# Patient Record
Sex: MALE | Race: WHITE | Employment: UNEMPLOYED | URBAN - METROPOLITAN AREA
[De-identification: names, ages, dates, MRNs, and addresses within clinical notes are randomized per-mention and may not be internally consistent; named-entity substitution may affect disease eponyms.]

---

## 2022-07-05 ENCOUNTER — HOSPITAL ENCOUNTER (OUTPATIENT)
Age: 52
Discharge: HOME | End: 2022-07-05
Attending: EMERGENCY MEDICINE
Payer: COMMERCIAL

## 2022-07-05 VITALS
SYSTOLIC BLOOD PRESSURE: 171 MMHG | BODY MASS INDEX: 36.53 KG/M2 | DIASTOLIC BLOOD PRESSURE: 99 MMHG | WEIGHT: 300 LBS | HEART RATE: 71 BPM | TEMPERATURE: 97 F | RESPIRATION RATE: 20 BRPM | OXYGEN SATURATION: 96 % | HEIGHT: 76 IN

## 2022-07-05 DIAGNOSIS — T78.3XXA ANGIOEDEMA DUE TO ANGIOTENSIN CONVERTING ENZYME INHIBITOR (ACE-I): Primary | ICD-10-CM

## 2022-07-05 DIAGNOSIS — T46.4X5A ANGIOEDEMA DUE TO ANGIOTENSIN CONVERTING ENZYME INHIBITOR (ACE-I): Primary | ICD-10-CM

## 2022-07-05 PROCEDURE — 99203 OFFICE O/P NEW LOW 30 MIN: CPT

## 2022-07-05 PROCEDURE — 99204 OFFICE O/P NEW MOD 45 MIN: CPT

## 2022-07-05 RX ORDER — BUPROPION HYDROCHLORIDE 300 MG/1
300 TABLET ORAL EVERY MORNING
COMMUNITY
Start: 2022-04-18

## 2022-07-05 RX ORDER — VILAZODONE HYDROCHLORIDE 40 MG/1
40 TABLET ORAL
COMMUNITY
Start: 2022-06-06

## 2022-07-05 RX ORDER — LISINOPRIL 20 MG/1
20 TABLET ORAL DAILY
COMMUNITY
Start: 2022-04-09

## 2022-07-05 RX ORDER — PREDNISONE 20 MG/1
60 TABLET ORAL ONCE
Status: COMPLETED | OUTPATIENT
Start: 2022-07-05 | End: 2022-07-05

## 2024-10-28 RX ORDER — LOSARTAN POTASSIUM 100 MG/1
1 TABLET ORAL DAILY
COMMUNITY

## 2024-10-28 RX ORDER — ATORVASTATIN CALCIUM 40 MG/1
TABLET, FILM COATED ORAL NIGHTLY
COMMUNITY
Start: 2024-01-15

## 2024-10-28 RX ORDER — METFORMIN HYDROCHLORIDE 500 MG/1
TABLET, EXTENDED RELEASE ORAL
COMMUNITY

## 2024-10-28 RX ORDER — DEXTROAMPHETAMINE SACCHARATE, AMPHETAMINE ASPARTATE MONOHYDRATE, DEXTROAMPHETAMINE SULFATE AND AMPHETAMINE SULFATE 7.5; 7.5; 7.5; 7.5 MG/1; MG/1; MG/1; MG/1
CAPSULE, EXTENDED RELEASE ORAL EVERY MORNING
COMMUNITY

## 2024-10-28 RX ORDER — MUPIROCIN 20 MG/G
1 OINTMENT TOPICAL 2 TIMES DAILY PRN
COMMUNITY
Start: 2024-04-08 | End: 2025-09-30

## 2024-10-28 RX ORDER — LEVOTHYROXINE SODIUM 100 UG/1
TABLET ORAL
COMMUNITY

## 2024-10-28 RX ORDER — CHLORAL HYDRATE 500 MG
2 CAPSULE ORAL DAILY
COMMUNITY
End: 2024-11-01

## 2024-10-28 RX ORDER — HYDROCHLOROTHIAZIDE 25 MG/1
12.5 TABLET ORAL
COMMUNITY
Start: 2024-02-19

## 2024-10-31 ENCOUNTER — APPOINTMENT (OUTPATIENT)
Dept: GENERAL RADIOLOGY | Facility: HOSPITAL | Age: 54
End: 2024-10-31
Attending: STUDENT IN AN ORGANIZED HEALTH CARE EDUCATION/TRAINING PROGRAM
Payer: COMMERCIAL

## 2024-10-31 ENCOUNTER — HOSPITAL ENCOUNTER (OUTPATIENT)
Facility: HOSPITAL | Age: 54
Discharge: HOME | End: 2024-11-01
Attending: ORTHOPAEDIC SURGERY | Admitting: ORTHOPAEDIC SURGERY
Payer: COMMERCIAL

## 2024-10-31 ENCOUNTER — ANESTHESIA (OUTPATIENT)
Dept: SURGERY | Facility: HOSPITAL | Age: 54
End: 2024-10-31
Payer: COMMERCIAL

## 2024-10-31 ENCOUNTER — ANESTHESIA EVENT (OUTPATIENT)
Dept: SURGERY | Facility: HOSPITAL | Age: 54
End: 2024-10-31
Payer: COMMERCIAL

## 2024-10-31 PROBLEM — G47.33 OSA (OBSTRUCTIVE SLEEP APNEA): Status: ACTIVE | Noted: 2024-10-31

## 2024-10-31 PROBLEM — M19.011 PRIMARY OSTEOARTHRITIS OF RIGHT SHOULDER: Status: ACTIVE | Noted: 2024-10-31

## 2024-10-31 PROBLEM — E66.01 MORBID OBESITY WITH BMI OF 40.0-44.9, ADULT (HCC): Status: ACTIVE | Noted: 2024-10-31

## 2024-10-31 PROBLEM — E03.9 HYPOTHYROIDISM: Status: ACTIVE | Noted: 2024-10-31

## 2024-10-31 PROBLEM — E78.5 HYPERLIPIDEMIA: Status: ACTIVE | Noted: 2024-10-31

## 2024-10-31 PROBLEM — I10 ESSENTIAL HYPERTENSION: Status: ACTIVE | Noted: 2024-10-31

## 2024-10-31 LAB
GLUCOSE BLDC GLUCOMTR-MCNC: 153 MG/DL (ref 70–99)
GLUCOSE BLDC GLUCOMTR-MCNC: 175 MG/DL (ref 70–99)
GLUCOSE BLDC GLUCOMTR-MCNC: 189 MG/DL (ref 70–99)
GLUCOSE BLDC GLUCOMTR-MCNC: 211 MG/DL (ref 70–99)
GLUCOSE BLDC GLUCOMTR-MCNC: 215 MG/DL (ref 70–99)

## 2024-10-31 PROCEDURE — 0RRJ0JZ REPLACEMENT OF RIGHT SHOULDER JOINT WITH SYNTHETIC SUBSTITUTE, OPEN APPROACH: ICD-10-PCS | Performed by: ORTHOPAEDIC SURGERY

## 2024-10-31 PROCEDURE — 73030 X-RAY EXAM OF SHOULDER: CPT | Performed by: STUDENT IN AN ORGANIZED HEALTH CARE EDUCATION/TRAINING PROGRAM

## 2024-10-31 PROCEDURE — 99204 OFFICE O/P NEW MOD 45 MIN: CPT | Performed by: HOSPITALIST

## 2024-10-31 DEVICE — TOBRA FULL DOSE ANTIBIOTIC BONE CEMENT, 10 PACK CATALOG NUMBER IS 6197-9-010
Type: IMPLANTABLE DEVICE | Site: SHOULDER | Status: FUNCTIONAL
Brand: SIMPLEX

## 2024-10-31 DEVICE — ALTIVATE ANATOMIC, NEUTRAL HUMERAL HEAD, 50X20
Type: IMPLANTABLE DEVICE | Site: SHOULDER | Status: FUNCTIONAL
Brand: DJO SURGICAL

## 2024-10-31 DEVICE — IMPLANTABLE DEVICE: Type: IMPLANTABLE DEVICE | Status: FUNCTIONAL

## 2024-10-31 DEVICE — ALTIVATE ANATOMIC CS, HUMERAL STEM AND NECK KIT, SIZE 3
Type: IMPLANTABLE DEVICE | Site: SHOULDER | Status: FUNCTIONAL
Brand: DJO SURGICAL

## 2024-10-31 RX ORDER — ONDANSETRON 2 MG/ML
4 INJECTION INTRAMUSCULAR; INTRAVENOUS EVERY 6 HOURS PRN
Status: DISCONTINUED | OUTPATIENT
Start: 2024-10-31 | End: 2024-11-01

## 2024-10-31 RX ORDER — MORPHINE SULFATE 4 MG/ML
4 INJECTION, SOLUTION INTRAMUSCULAR; INTRAVENOUS EVERY 10 MIN PRN
Status: DISCONTINUED | OUTPATIENT
Start: 2024-10-31 | End: 2024-10-31 | Stop reason: HOSPADM

## 2024-10-31 RX ORDER — VILAZODONE HYDROCHLORIDE 10 MG/1
10 TABLET ORAL EVERY MORNING
Status: DISCONTINUED | OUTPATIENT
Start: 2024-11-01 | End: 2024-11-01

## 2024-10-31 RX ORDER — FAMOTIDINE 20 MG/1
20 TABLET, FILM COATED ORAL ONCE
Status: COMPLETED | OUTPATIENT
Start: 2024-10-31 | End: 2024-10-31

## 2024-10-31 RX ORDER — NICOTINE POLACRILEX 4 MG
15 LOZENGE BUCCAL
Status: DISCONTINUED | OUTPATIENT
Start: 2024-10-31 | End: 2024-10-31 | Stop reason: HOSPADM

## 2024-10-31 RX ORDER — HYDROMORPHONE HYDROCHLORIDE 1 MG/ML
0.4 INJECTION, SOLUTION INTRAMUSCULAR; INTRAVENOUS; SUBCUTANEOUS EVERY 5 MIN PRN
Status: DISCONTINUED | OUTPATIENT
Start: 2024-10-31 | End: 2024-10-31 | Stop reason: HOSPADM

## 2024-10-31 RX ORDER — FAMOTIDINE 10 MG/ML
20 INJECTION, SOLUTION INTRAVENOUS ONCE
Status: COMPLETED | OUTPATIENT
Start: 2024-10-31 | End: 2024-10-31

## 2024-10-31 RX ORDER — SODIUM PHOSPHATE, DIBASIC AND SODIUM PHOSPHATE, MONOBASIC 7; 19 G/230ML; G/230ML
1 ENEMA RECTAL ONCE AS NEEDED
Status: DISCONTINUED | OUTPATIENT
Start: 2024-10-31 | End: 2024-11-01

## 2024-10-31 RX ORDER — POLYETHYLENE GLYCOL 3350 17 G/17G
17 POWDER, FOR SOLUTION ORAL DAILY PRN
Status: DISCONTINUED | OUTPATIENT
Start: 2024-10-31 | End: 2024-11-01

## 2024-10-31 RX ORDER — HYDROMORPHONE HYDROCHLORIDE 1 MG/ML
0.2 INJECTION, SOLUTION INTRAMUSCULAR; INTRAVENOUS; SUBCUTANEOUS EVERY 5 MIN PRN
Status: DISCONTINUED | OUTPATIENT
Start: 2024-10-31 | End: 2024-10-31 | Stop reason: HOSPADM

## 2024-10-31 RX ORDER — CEFAZOLIN SODIUM IN 0.9 % NACL 3 G/100 ML
3 INTRAVENOUS SOLUTION, PIGGYBACK (ML) INTRAVENOUS EVERY 8 HOURS
Status: COMPLETED | OUTPATIENT
Start: 2024-10-31 | End: 2024-10-31

## 2024-10-31 RX ORDER — DOCUSATE SODIUM 100 MG/1
100 CAPSULE, LIQUID FILLED ORAL 2 TIMES DAILY
Status: DISCONTINUED | OUTPATIENT
Start: 2024-10-31 | End: 2024-11-01

## 2024-10-31 RX ORDER — HYDROCHLOROTHIAZIDE 12.5 MG/1
12.5 TABLET ORAL DAILY
Status: DISCONTINUED | OUTPATIENT
Start: 2024-11-01 | End: 2024-11-01

## 2024-10-31 RX ORDER — HYDROMORPHONE HYDROCHLORIDE 1 MG/ML
0.6 INJECTION, SOLUTION INTRAMUSCULAR; INTRAVENOUS; SUBCUTANEOUS EVERY 5 MIN PRN
Status: DISCONTINUED | OUTPATIENT
Start: 2024-10-31 | End: 2024-10-31 | Stop reason: HOSPADM

## 2024-10-31 RX ORDER — NALOXONE HYDROCHLORIDE 0.4 MG/ML
0.08 INJECTION, SOLUTION INTRAMUSCULAR; INTRAVENOUS; SUBCUTANEOUS AS NEEDED
Status: DISCONTINUED | OUTPATIENT
Start: 2024-10-31 | End: 2024-10-31 | Stop reason: HOSPADM

## 2024-10-31 RX ORDER — ONDANSETRON 2 MG/ML
INJECTION INTRAMUSCULAR; INTRAVENOUS AS NEEDED
Status: DISCONTINUED | OUTPATIENT
Start: 2024-10-31 | End: 2024-10-31 | Stop reason: SURG

## 2024-10-31 RX ORDER — OXYCODONE HYDROCHLORIDE 5 MG/1
15 TABLET ORAL EVERY 4 HOURS PRN
Status: DISCONTINUED | OUTPATIENT
Start: 2024-10-31 | End: 2024-11-01

## 2024-10-31 RX ORDER — SENNOSIDES 8.6 MG
17.2 TABLET ORAL NIGHTLY
Status: DISCONTINUED | OUTPATIENT
Start: 2024-10-31 | End: 2024-11-01

## 2024-10-31 RX ORDER — ACETAMINOPHEN 500 MG
500 TABLET ORAL EVERY 6 HOURS PRN
Status: DISCONTINUED | OUTPATIENT
Start: 2024-10-31 | End: 2024-11-01

## 2024-10-31 RX ORDER — PROCHLORPERAZINE EDISYLATE 5 MG/ML
5 INJECTION INTRAMUSCULAR; INTRAVENOUS EVERY 8 HOURS PRN
Status: DISCONTINUED | OUTPATIENT
Start: 2024-10-31 | End: 2024-11-01

## 2024-10-31 RX ORDER — DIPHENHYDRAMINE HYDROCHLORIDE 50 MG/ML
25 INJECTION INTRAMUSCULAR; INTRAVENOUS ONCE AS NEEDED
Status: ACTIVE | OUTPATIENT
Start: 2024-10-31 | End: 2024-10-31

## 2024-10-31 RX ORDER — SODIUM CHLORIDE, SODIUM LACTATE, POTASSIUM CHLORIDE, CALCIUM CHLORIDE 600; 310; 30; 20 MG/100ML; MG/100ML; MG/100ML; MG/100ML
INJECTION, SOLUTION INTRAVENOUS CONTINUOUS
Status: DISCONTINUED | OUTPATIENT
Start: 2024-10-31 | End: 2024-10-31 | Stop reason: HOSPADM

## 2024-10-31 RX ORDER — ATORVASTATIN CALCIUM 40 MG/1
40 TABLET, FILM COATED ORAL NIGHTLY
Status: DISCONTINUED | OUTPATIENT
Start: 2024-10-31 | End: 2024-11-01

## 2024-10-31 RX ORDER — BUPROPION HYDROCHLORIDE 150 MG/1
150 TABLET ORAL EVERY MORNING
Status: DISCONTINUED | OUTPATIENT
Start: 2024-11-01 | End: 2024-11-01

## 2024-10-31 RX ORDER — DEXTROSE MONOHYDRATE 25 G/50ML
50 INJECTION, SOLUTION INTRAVENOUS
Status: DISCONTINUED | OUTPATIENT
Start: 2024-10-31 | End: 2024-10-31 | Stop reason: HOSPADM

## 2024-10-31 RX ORDER — BISACODYL 10 MG
10 SUPPOSITORY, RECTAL RECTAL
Status: DISCONTINUED | OUTPATIENT
Start: 2024-10-31 | End: 2024-11-01

## 2024-10-31 RX ORDER — LEVOTHYROXINE SODIUM 100 UG/1
100 TABLET ORAL
Status: DISCONTINUED | OUTPATIENT
Start: 2024-11-01 | End: 2024-11-01

## 2024-10-31 RX ORDER — LOSARTAN POTASSIUM 100 MG/1
100 TABLET ORAL DAILY
Status: DISCONTINUED | OUTPATIENT
Start: 2024-11-01 | End: 2024-11-01

## 2024-10-31 RX ORDER — SODIUM CHLORIDE, SODIUM LACTATE, POTASSIUM CHLORIDE, CALCIUM CHLORIDE 600; 310; 30; 20 MG/100ML; MG/100ML; MG/100ML; MG/100ML
INJECTION, SOLUTION INTRAVENOUS CONTINUOUS
Status: DISCONTINUED | OUTPATIENT
Start: 2024-10-31 | End: 2024-11-01

## 2024-10-31 RX ORDER — OXYCODONE HYDROCHLORIDE 5 MG/1
10 TABLET ORAL EVERY 4 HOURS PRN
Status: DISCONTINUED | OUTPATIENT
Start: 2024-10-31 | End: 2024-11-01

## 2024-10-31 RX ORDER — ASPIRIN 81 MG/1
81 TABLET ORAL DAILY
Status: DISCONTINUED | OUTPATIENT
Start: 2024-11-02 | End: 2024-11-01

## 2024-10-31 RX ORDER — TRANEXAMIC ACID 10 MG/ML
INJECTION, SOLUTION INTRAVENOUS AS NEEDED
Status: DISCONTINUED | OUTPATIENT
Start: 2024-10-31 | End: 2024-10-31 | Stop reason: SURG

## 2024-10-31 RX ORDER — ROCURONIUM BROMIDE 10 MG/ML
INJECTION, SOLUTION INTRAVENOUS AS NEEDED
Status: DISCONTINUED | OUTPATIENT
Start: 2024-10-31 | End: 2024-10-31 | Stop reason: SURG

## 2024-10-31 RX ORDER — OXYCODONE HYDROCHLORIDE 5 MG/1
5 TABLET ORAL EVERY 4 HOURS PRN
Status: DISCONTINUED | OUTPATIENT
Start: 2024-10-31 | End: 2024-11-01

## 2024-10-31 RX ORDER — NICOTINE POLACRILEX 4 MG
30 LOZENGE BUCCAL
Status: DISCONTINUED | OUTPATIENT
Start: 2024-10-31 | End: 2024-10-31 | Stop reason: HOSPADM

## 2024-10-31 RX ORDER — ROPIVACAINE HYDROCHLORIDE 5 MG/ML
INJECTION, SOLUTION EPIDURAL; INFILTRATION; PERINEURAL
Status: COMPLETED | OUTPATIENT
Start: 2024-10-31 | End: 2024-10-31

## 2024-10-31 RX ORDER — MORPHINE SULFATE 10 MG/ML
6 INJECTION, SOLUTION INTRAMUSCULAR; INTRAVENOUS EVERY 10 MIN PRN
Status: DISCONTINUED | OUTPATIENT
Start: 2024-10-31 | End: 2024-10-31 | Stop reason: HOSPADM

## 2024-10-31 RX ORDER — ACETAMINOPHEN 500 MG
1000 TABLET ORAL ONCE
Status: COMPLETED | OUTPATIENT
Start: 2024-10-31 | End: 2024-10-31

## 2024-10-31 RX ORDER — MORPHINE SULFATE 4 MG/ML
4 INJECTION, SOLUTION INTRAMUSCULAR; INTRAVENOUS EVERY 4 HOURS PRN
Status: DISCONTINUED | OUTPATIENT
Start: 2024-10-31 | End: 2024-11-01

## 2024-10-31 RX ORDER — MORPHINE SULFATE 4 MG/ML
2 INJECTION, SOLUTION INTRAMUSCULAR; INTRAVENOUS EVERY 10 MIN PRN
Status: DISCONTINUED | OUTPATIENT
Start: 2024-10-31 | End: 2024-10-31 | Stop reason: HOSPADM

## 2024-10-31 RX ORDER — LIDOCAINE HYDROCHLORIDE 10 MG/ML
INJECTION, SOLUTION EPIDURAL; INFILTRATION; INTRACAUDAL; PERINEURAL AS NEEDED
Status: DISCONTINUED | OUTPATIENT
Start: 2024-10-31 | End: 2024-10-31 | Stop reason: SURG

## 2024-10-31 RX ADMIN — TRANEXAMIC ACID 1000 MG: 10 INJECTION, SOLUTION INTRAVENOUS at 07:37:00

## 2024-10-31 RX ADMIN — TRANEXAMIC ACID 1000 MG: 10 INJECTION, SOLUTION INTRAVENOUS at 08:38:00

## 2024-10-31 RX ADMIN — LIDOCAINE HYDROCHLORIDE 50 MG: 10 INJECTION, SOLUTION EPIDURAL; INFILTRATION; INTRACAUDAL; PERINEURAL at 07:26:00

## 2024-10-31 RX ADMIN — ROCURONIUM BROMIDE 70 MG: 10 INJECTION, SOLUTION INTRAVENOUS at 07:33:00

## 2024-10-31 RX ADMIN — SODIUM CHLORIDE, SODIUM LACTATE, POTASSIUM CHLORIDE, CALCIUM CHLORIDE: 600; 310; 30; 20 INJECTION, SOLUTION INTRAVENOUS at 09:28:00

## 2024-10-31 RX ADMIN — ROPIVACAINE HYDROCHLORIDE 30 ML: 5 INJECTION, SOLUTION EPIDURAL; INFILTRATION; PERINEURAL at 06:58:00

## 2024-10-31 RX ADMIN — ONDANSETRON 4 MG: 2 INJECTION INTRAMUSCULAR; INTRAVENOUS at 09:02:00

## 2024-10-31 NOTE — CONSULTS
[facility]    PATIENT'S NAME: ALFREDITO SKY   ATTENDING PHYSICIAN: Micheal Sanchez MD   CONSULTING PHYSICIAN: Sveta Quevedo MD   PATIENT ACCOUNT#:   [id number]    LOCATION:   Room 8 A [facility]  MEDICAL RECORD #:   [id number]       YOB: 1970  ADMISSION DATE:       10/31/2024      CONSULT DATE:  10/31/2024    REPORT OF CONSULTATION      REASON FOR ADMISSION:  Post right total shoulder arthroplasty.    HISTORY OF PRESENT ILLNESS:  Patient is a 54-year-old  male with chronic right shoulder pain and underlying severe primary osteoarthritis, failed outpatient conservative medical management options.  Scheduled today for above-mentioned procedure by his orthopedic surgeon, Dr. Micheal Sanchez.  Preoperatively, he had interscalene block.  Postoperatively, transferred to PACU for further monitoring.    PAST MEDICAL HISTORY:  Generalized osteoarthritis, morbid obesity, obstructive sleep apnea, diabetes mellitus type 2, hypertension, hyperlipidemia, hypothyroidism, attention deficit disorder.    PAST SURGICAL HISTORY:  Left Achilles tendon repair, appendectomy.    MEDICATIONS:  Please see medication reconciliation list.     ALLERGIES:  Lisinopril.    SOCIAL HISTORY:  No tobacco, alcohol, or drug use.  Lives with his family.  Independent in his basic activities of daily living.     FAMILY HISTORY:  Positive for hypertension.    REVIEW OF SYSTEMS:  Currently resting in bed.  No right shoulder pain.  No chest pain.  No shortness of breath.  Other 12-point review of systems is negative.       PHYSICAL EXAMINATION:    GENERAL:  Alert and oriented to time, place and person.  No acute distress.   VITAL SIGNS:  Temperature 97.6, pulse 82, respiratory rate 15, blood pressure 152/77, pulse ox 93% on 2L nasal cannula oxygen.  HEENT:  Atraumatic.  Oropharynx clear.  Moist mucous membranes.  Ears and nose normal.  Eyes:  Anicteric sclerae.   NECK:  Supple.  No lymphadenopathy.  Trachea midline.  Full  range of motion.   LUNGS:  Clear to auscultation bilaterally.  Symmetric breathing sounds.   HEART:  Regular rate and rhythm.  S1 and S2 auscultated.  No murmur.    ABDOMEN:  Soft, nondistended.  Obese.  Positive bowel sounds.   EXTREMITIES:  No edema, clubbing or cyanosis.  Right shoulder dressing, Hemovac surgical drain, sling immobilizer.  NEUROLOGIC:  Decreased sensation and muscle movement in right upper extremity, post interscalene block.  No other focal findings.    ASSESSMENT AND PLAN:    1.   Right shoulder primary osteoarthritis, status post right total shoulder arthroplasty.  Interscalene block.  Pain control.  Neurovascular checks.  DVT prophylaxis.  Physical and occupational therapy.  Monitor surgical wound and drain.   2.   Essential hypertension.  Continue home medications and monitor.  3.   Hyperlipidemia.  Continue home medications.  4.   Hypothyroidism.  Continue home medications.  5.   Morbid obesity.  Monitor respiratory and hemodynamic status.  6.   Obstructive sleep apnea.  Apply obstructive sleep apnea protocol and monitor.    Dictated By Sveta Quevedo MD  d: 10/31/2024 11:37:13  t: 10/31/2024 12:19:58  Job [id number]  FB/    cc: Micheal Sanchez MD

## 2024-10-31 NOTE — ANESTHESIA PREPROCEDURE EVALUATION
Anesthesia PreOp Note    HPI:     Curtis Menard is a 54 year old male who presents for preoperative consultation requested by: Micheal Sanchez MD    Date of Surgery: 10/31/2024    Procedure(s):  Right total shoulder arthroplasty, biceps tenodesis  Indication: Primary osteoarthritis, right shoulder    Relevant Problems   No relevant active problems       NPO:  Last Liquid Consumption Date: 10/30/24  Last Liquid Consumption Time: 2130  Last Solid Consumption Date: 10/30/24  Last Solid Consumption Time: 2130  Last Liquid Consumption Date: 10/30/24          History Review:  There are no active problems to display for this patient.      Past Medical History:    Attention deficit hyperactivity disorder (ADHD)    Depression    Diabetes (HCC)    Disorder of thyroid    Essential hypertension    High cholesterol    Osteoarthritis    Sleep apnea    CPAP    Tinnitus of both ears       Past Surgical History:   Procedure Laterality Date    Achilles tendon lengthening Left 12/2022    Appendectomy  2024    Laparoscopic    Colonoscopy      Exploratory retroperitoneal  1979    Exploratory of Kidney (does not remember side) when in 4th grade. No dx       Prescriptions Prior to Admission[1]  Current Medications and Prescriptions Ordered in Epic[2]    Allergies[3]    History reviewed. No pertinent family history.  Social History     Socioeconomic History    Marital status:    Tobacco Use    Smoking status: Never    Smokeless tobacco: Never   Substance and Sexual Activity    Drug use: Not Currently       Available pre-op labs reviewed.     Lab Results   Component Value Date    PGLU 153 (H) 10/31/2024          Vital Signs:  Body mass index is 40.78 kg/m².   height is 1.93 m (6' 4\") and weight is 152 kg (335 lb) (abnormal). His oral temperature is 98.3 °F (36.8 °C). His blood pressure is 151/90 and his pulse is 90. His respiration is 18 and oxygen saturation is 95%.   Vitals:    10/28/24 1236 10/31/24 0611   BP:   151/90   Pulse:  90   Resp:  18   Temp:  98.3 °F (36.8 °C)   TempSrc:  Oral   SpO2:  95%   Weight: (!) 145.2 kg (320 lb) (!) 152 kg (335 lb)   Height: 1.93 m (6' 4\") 1.93 m (6' 4\")        Anesthesia Evaluation      Airway   Mallampati: III  TM distance: >3 FB  Neck ROM: full  Dental - Dentition appears grossly intact     Pulmonary - normal exam   (+) sleep apnea on CPAP  Cardiovascular - normal exam  (+) hypertension well controlled    Rhythm: regular  Rate: normal    Neuro/Psych    (+)   attention deficit hyperactivity disorder, depression      GI/Hepatic/Renal      Endo/Other    (+) diabetes mellitus type 2 poorly controlled  Abdominal  - normal exam                 Anesthesia Plan:   ASA:  3  Plan:   General  Airway:  ETT  Post-op Pain Management: Interscalene block  Informed Consent Plan and Risks Discussed With:  Patient      I have informed Curtis Menard and/or legal guardian or family member of the nature of the anesthetic plan, benefits, risks including possible dental damage if relevant, major complications, and any alternative forms of anesthetic management.   All of the patient's questions were answered to the best of my ability. The patient desires the anesthetic management as planned.  Armando Downey MD  10/31/2024 6:53 AM  Present on Admission:  **None**           [1]   Medications Prior to Admission   Medication Sig Dispense Refill Last Dose/Taking    losartan 100 MG Oral Tab Take 1 tablet (100 mg total) by mouth daily.   10/30/2024 at  8:00 AM    atorvastatin 40 MG Oral Tab nightly.   10/30/2024 at  2:00 PM    levothyroxine 100 MCG Oral Tab before breakfast.   10/31/2024 at  5:00 AM    Vilazodone HCl (VIIBRYD OR) Take 10 mg by mouth every morning.   10/31/2024 at  5:00 AM    buPROPion  MG Oral Tablet 24 Hr Take 150 mg by mouth every morning.   10/31/2024 at  5:00 AM    amphetamine-dextroamphetamine ER (ADDERALL XR) 30 MG Oral Capsule SR 24 Hr every morning.   10/25/2024     hydroCHLOROthiazide 25 MG Oral Tab 0.5 tablets (12.5 mg total).   More than a month    metFORMIN  MG Oral Tablet 24 Hr TAKE 2 TABLETS BY MOUTH DAILY WITH THE EVENING MEAL for 90   10/29/2024    mupirocin 2 % External Ointment 1 Application 2 (two) times daily as needed. For Nasal Irritation PRN from CPAP use   More than a month    omega-3 fatty acids 1000 MG Oral Cap Take 2,000 mg by mouth daily.   More than a month   [2]   Current Facility-Administered Medications Ordered in Epic   Medication Dose Route Frequency Provider Last Rate Last Admin    lactated ringers infusion   Intravenous Continuous Micheal Sanchez MD 20 mL/hr at 10/31/24 0616 New Bag at 10/31/24 0616    ceFAZolin (Ancef) 2g in 10mL IV syringe premix  2 g Intravenous Once Trena Moore PA-C         No current [facility]-ordered outpatient medications on file.   [3]   Allergies  Allergen Reactions    Lisinopril ANGIOEDEMA    Tetracycline UNKNOWN     As a child

## 2024-10-31 NOTE — OPERATIVE REPORT
[facility] POST ANESTHESIA CARE UNIT  Operative Note     Curtis Menard Location: OR   [facility] [id number] MRN [id number]   Admission Date 10/31/2024 Operation Date 10/31/2024   Attending Physician Micheal Sanchez,* Operating Physician Micheal Sanchez MD      Preoperative Diagnosis:   Primary osteoarthritis, right shoulder  Right shoulder biceps tendonitis  Right shoulder posterior glenoid erosion  Right shoulder static posterior subluxation     Postoperative Diagnosis:   same     Procedure Performed:   Right total shoulder arthroplasty, biceps tenodesis   Augmented glenoid with anterior capsulectomy    Primary Surgeon: Micheal Sanchez MD      Assistant: Keerthi Amezcua SA     Surgical Findings:see below     Anesthesia: General + interscalene     Complications: none     Implants:   Implant Name Type Inv. Item Serial No.  Lot No. LRB No. Used Action   Augmented Pegged Glenoid   N/A [employer]  [id number] Right 1 Implanted   CEMENT BNE 20ML 41GM FLL DOSE PMMA W/ TOBRA M - SN/A  CEMENT BNE 20ML 41GM FLL DOSE PMMA W/ TOBRA M N/A [employer]  [id number] Right 1 Implanted   HEAD HUM 50MM X 20MM NEUT STD OFFSET COCR - SN/A  HEAD HUM 50MM X 20MM NEUT STD OFFSET COCR N/A [employer]  [id number] Right 1 Implanted   KIT TOT SHLDR CNL SPRNG INCL SZ 3 L24MM TI - SN/A  KIT TOT SHLDR CNL SPRNG INCL SZ 3 L24MM TI N/A [employer]  [id number] Right 1 Implanted        Specimen: none     Drains: medium hemovac     Condition: stable, extubated to pacu     Estimated Blood Loss: No data recorded     Summary of Case: see below    Brief Clinical Note/Indications for procedure:    Patient with signs and symptoms consistent with  arthritis. After discussion of the risks and benefits the patient agreed and wished to proceed with surgery.  Procedure in Detail:   The informed consent was obtained and the preoperative site was marked. I went  over the consent in detail including the risks and benefits again. Patient agreed and wished to proceed. At this time the anesthesia team commenced with a regional block per their protocol. Patient was taken to the operating room and intubated.   At this point the patient was placed in the beach chair position with the head of the bed elevated approximately 45 degrees. All down side pressure points were appropriately padded.   Pre-op exam under anesthesia confirmed some stiffness.  + crepitus  The patient was prepped and draped in the usual sterile fashion, antibiotics were administered and a time-out was conducted.   We used a first generation cephalosporin and all antibiotics were fully given within an hour of incision.  SCD's were applied preop for appropriate DVT prophylaxis.  We used the deltopectoral incision from the coracoid to a 10 cm distal. We found the cephalic vein and took it laterally, it was protected throughout the case. We opened the deltopectoral interval widely and placed retractors under the CA ligament in the subacromial space and under the deltoid tendon at its insertion. We then abducted and internally rotated the arm and released the underlying bursa between these retractors, taking care not to damage the circumflex branch of the axillary nerve.   Next, we brought the arm back in adduction at slight forward flexion with external rotation. We open the clavipectoral fascia lateral to the conjoint tendon. We gently palpated the axillary nerve and verified its position and continuity on both sides of the humerus with a TUG test. Note, this test was repeated multiple times during the procedure for nerve localization and confirmed to be intact at the end of the case.   We then cauterized the anterior humeral circumflex (“Three sisters”) vessels. The arm was then internally rotated, we cut the falciform ligament at approximately 1 cm of the upper portion of the pectoralis major insertion. Next we  unroofed the bicipital groove. The biceps tendon was tendinopathic with inflammatory synovium surrounding. We proceeded with a soft tissue biceps tenodesis given the pathology of the tendon.  After opening the biceps tendon sheath all the way to the supraglenoid tubercle we tenodesed the biceps tendon with 2  # 2 Ti-Cron sutures to the upper border of the pectoralis major. The proximal portion of the tendon was excised.   At this point we visualized the rotator cuff, it was intact.    We performed a lesser tuberosity osteotomy.  Next we dissected the subscapularis off of the underlying capsule and then released the inferior capsule from the humerus all the way to the posterior band of the inferior glenohumeral ligament. When this was complete we gently dislocated the shoulder up into the wound. We removed the osteophytes and made our cut at the appropriate inclination and  degrees of retroversion.  The humerus was prepped in the standard fashion and a protector plate was placed.   We then turned our attention back to the glenoid, placing a Fukuda retractor to retract the proximal humerus posteriorly we had exposure to the glenoid.    We then grasped the remaining stump of the biceps and remove the labrum circumferentially.   During the glenoid exposure the axillary nerve was protected the entire time.  The capsule was released from the glenoid to past the posterior/inferior quadrant.    The glenoid was biconcave with erosion posteriorly.   We prepped for the augmented glenoid, using a cannulated reamer to ream the anterior half and then the cannulated, angled reamer for the posterior portion.    The trial fit perfectly and the holes were drilled.    We placed the pegged trial and then inserted the trial humerus.  The translation was appropriate.  The humeral head was dislocatable posteriorly, but always spontaneously reduced and was not unstable unless a posterior load and shift maneuver was performed.  We used  thrombin soaked surgicell for hemostasis, then pressurized the peripheral holes with cement.  The glenoid was placed and held in place for 20 minutes until the cement was completely cured.    The humerus was trialed again and the final humeral component was inserted after verifying no undue instability posteriorly and appropriate subscap tension.  The lesser tuberosity osteotomy was repaired with 4 sutures around the biceps groove. 1 around the prosthesis. 1 in the lateral part of the rotator interval.  We again verified the tension on the axillary nerve, the range of motion was appropriate. We will close the deltopectoral interval deep to the cephalic vein with a running, non-locked 0 VICRYL suture. The skin was closed with 3-0 VICRYL and 3-0 BIOSYN.   A dry sterile dressing was applied. Patient was extubated, transferred to a stretcher bed and to the post anesthesia care unit in stable condition.      PQRS  Antibiotics: first generation, weight-based cephalosporin administered within 30 minutes of incision for all patients who are not penicillin allergic, otherwise clindamycin is used  DVT prophylaxis: aspirin, hydration, SCD's, and early ambulation used appropriately in this low risk procedure    Assistance:  Note the assistance of Keerthi Cevallos MD was required as no ACGME qualifying resident or fellow was available.  She assisted with all aspects of the case including positioning, prepping, draping, retraction and closure.  Her assistance was invaluable.    22 modifier  The patients BMI was greater than 30. In fact it was Body mass index is 40.78 kg/m². This obesity required extra operative time, an extra assistant, extra retractors and increased surgical effort.  For this reason the 22 modifier is appended.  In addition this case required the use of an augmented glenoid and anteroinferior capsulectomy to correct the static posterior subluxation.                Micheal Sanchez MD  10/31/2024  10:24  AM

## 2024-10-31 NOTE — ANESTHESIA PROCEDURE NOTES
Peripheral Block    Date/Time: 10/31/2024 6:58 AM    Performed by: Armando Downey MD  Authorized by: Armando Downey MD      General Information and Staff    Start Time:  10/31/2024 6:58 AM  End Time:  10/31/2024 7:04 AM  Anesthesiologist:  Armando Downey MD  Performed by:  Anesthesiologist  Patient Location:  PACU      Site Identification: real time ultrasound guided and image stored and retrievable      Reason for Block: at surgeon's request and post-op pain management    Preanesthetic Checklist: 2 patient identifers, IV checked, risks and benefits discussed, monitors and equipment checked, pre-op evaluation, timeout performed, anesthesia consent and sterile technique used      Procedure Details    Patient Position:  Sitting  Prep: ChloraPrep    Monitoring:  Cardiac monitor  Block Type:  Interscalene  Injection Technique:  Single-shot    Needle    Needle Type:  Short-bevel  Needle Gauge:  22 G  Needle Length:  50 mm  Needle Localization:  Ultrasound guidance              Assessment    Injection Assessment:  Good spread noted, incremental injection, local visualized surrounding nerve on ultrasound, low pressure, negative aspiration for heme and no pain on injection  Heart Rate Change: No        Medications  10/31/2024 6:58 AM  ropivacaine (NAROPIN) injection 0.5% - Infiltration   30 mL - 10/31/2024 6:58:00 AM    Additional Comments

## 2024-10-31 NOTE — H&P
[facility]  part of [facility]     History & Physical Examination    Patient Name: Curtis Menard  MRN: [id number]  CSN: [id number]  YOB: 1970    Diagnosis: right shoulder osteoarthritis    Prescriptions Prior to Admission[1]  Current Facility-Administered Medications   Medication Dose Route Frequency    lactated ringers infusion   Intravenous Continuous    ceFAZolin (Ancef) 2g in 10mL IV syringe premix  2 g Intravenous Once       Allergies: Allergies[2]    Past Medical History:    Attention deficit hyperactivity disorder (ADHD)    Depression    Diabetes (HCC)    Disorder of thyroid    Essential hypertension    High cholesterol    Osteoarthritis    Sleep apnea    CPAP    Tinnitus of both ears     Past Surgical History:   Procedure Laterality Date    Achilles tendon lengthening Left 12/2022    Appendectomy  2024    Laparoscopic    Colonoscopy      Exploratory retroperitoneal  1979    Exploratory of Kidney (does not remember side) when in 4th grade. No dx     History reviewed. No pertinent family history.  Social History     Tobacco Use    Smoking status: Never    Smokeless tobacco: Never   Substance Use Topics    Alcohol use: Not on file     Comment: social       SYSTEM Check if Review is Normal Check if Physical Exam is Normal If not normal, please explain:   HEENT [ X ] [ X ]    NECK & BACK [ X ] [ X ]    HEART [ X ] [ X ]    LUNGS [ X ] [ X ]    ABDOMEN [ X ] [ X ]    UROGENITAL [ X ] [ X ]    EXTREMITIES [ Right shoulder ] [ Right shoulder painful and limited ROM, NVI SILT ]    OTHER        [ x ] I have discussed the risks and benefits and alternatives with the patient/family.  They understand and agree to proceed with plan of care.  [ x ] I have reviewed the History and Physical done within the last 30 days.  Any changes noted above.    Trena Moore PA-C  10/31/2024  6:32 AM          [1]   Medications Prior to Admission   Medication Sig Dispense Refill Last Dose/Taking     atorvastatin 40 MG Oral Tab nightly.   10/30/2024 at  2:00 PM    hydroCHLOROthiazide 25 MG Oral Tab 0.5 tablets (12.5 mg total).   More than a month    mupirocin 2 % External Ointment 1 Application 2 (two) times daily as needed. For Nasal Irritation PRN from CPAP use   More than a month    Vilazodone HCl (VIIBRYD OR) Take 10 mg by mouth every morning.   10/31/2024 at  5:00 AM    buPROPion  MG Oral Tablet 24 Hr Take 150 mg by mouth every morning.   10/31/2024 at  5:00 AM    losartan 100 MG Oral Tab Take 1 tablet (100 mg total) by mouth daily.   10/30/2024 at  8:00 AM    amphetamine-dextroamphetamine ER (ADDERALL XR) 30 MG Oral Capsule SR 24 Hr every morning.   10/25/2024    levothyroxine 100 MCG Oral Tab before breakfast.   10/31/2024 at  5:00 AM    metFORMIN  MG Oral Tablet 24 Hr TAKE 2 TABLETS BY MOUTH DAILY WITH THE EVENING MEAL for 90   10/29/2024    omega-3 fatty acids 1000 MG Oral Cap Take 2,000 mg by mouth daily.   More than a month   [2]   Allergies  Allergen Reactions    Lisinopril ANGIOEDEMA    Tetracycline UNKNOWN     As a child

## 2024-10-31 NOTE — ANESTHESIA PROCEDURE NOTES
Airway  Date/Time: 10/31/2024 7:27 AM  Urgency: Elective      General Information and Staff    Patient location during procedure: OR  Anesthesiologist: Armando Downey MD  Performed: anesthesiologist   Performed by: Armando Downey MD  Authorized by: Armando Downey MD      Indications and Patient Condition  Indications for airway management: anesthesia  Sedation level: deep  Preoxygenated: yes  Patient position: sniffing  Mask difficulty assessment: 1 - vent by mask    Final Airway Details  Final airway type: endotracheal airway      Successful airway: ETT  Cuffed: yes   Successful intubation technique: Video laryngoscopy  Endotracheal tube insertion site: oral  Blade size: #4  ETT size (mm): 8.5    Cormack-Lehane Classification: grade I - full view of glottis  Placement verified by: capnometry   Measured from: teeth  Number of attempts at approach: 1

## 2024-10-31 NOTE — ANESTHESIA POSTPROCEDURE EVALUATION
Patient: Curtis Menard    Procedure Summary       Date: 10/31/24 Room / Location: [facility] MAIN OR  / [facility] MAIN OR    Anesthesia Start: 0712 Anesthesia Stop: 0933    Procedure: Right total shoulder arthroplasty, biceps tenodesis (Right: Shoulder) Diagnosis: (Primary osteoarthritis, right shoulder)    Surgeons: Micheal Sanchez MD Anesthesiologist: Armando Downey MD    Anesthesia Type: general ASA Status: 3            Anesthesia Type: general    Vitals Value Taken Time   /76 10/31/24 1002   Temp 97.8 °F (36.6 °C) 10/31/24 0930   Pulse 82 10/31/24 1005   Resp 18 10/31/24 1005   SpO2 94 % 10/31/24 1005   Vitals shown include unfiled device data.    [facility] AN Post Evaluation:   Patient Evaluated in PACU  Patient Participation: complete - patient participated  Level of Consciousness: awake  Pain Management: adequate  Airway Patency:patent  Dental exam unchanged from preop  Yes    Nausea/Vomiting: none  Cardiovascular Status: acceptable  Respiratory Status: acceptable and nasal cannula  Postoperative Hydration acceptable      Armando Downey MD  10/31/2024 10:05 AM

## 2024-11-01 VITALS
SYSTOLIC BLOOD PRESSURE: 115 MMHG | DIASTOLIC BLOOD PRESSURE: 70 MMHG | HEIGHT: 76 IN | RESPIRATION RATE: 16 BRPM | TEMPERATURE: 99 F | BODY MASS INDEX: 38.36 KG/M2 | WEIGHT: 315 LBS | OXYGEN SATURATION: 92 % | HEART RATE: 75 BPM

## 2024-11-01 LAB
ANION GAP SERPL CALC-SCNC: 6 MMOL/L (ref 0–18)
BUN BLD-MCNC: 16 MG/DL (ref 9–23)
BUN/CREAT SERPL: 19.3 (ref 10–20)
CALCIUM BLD-MCNC: 9.6 MG/DL (ref 8.7–10.4)
CHLORIDE SERPL-SCNC: 101 MMOL/L (ref 98–112)
CO2 SERPL-SCNC: 30 MMOL/L (ref 21–32)
CREAT BLD-MCNC: 0.83 MG/DL
DEPRECATED RDW RBC AUTO: 44.6 FL (ref 35.1–46.3)
EGFRCR SERPLBLD CKD-EPI 2021: 105 ML/MIN/1.73M2 (ref 60–?)
ERYTHROCYTE [DISTWIDTH] IN BLOOD BY AUTOMATED COUNT: 13 % (ref 11–15)
EST. AVERAGE GLUCOSE BLD GHB EST-MCNC: 140 MG/DL (ref 68–126)
GLUCOSE BLD-MCNC: 176 MG/DL (ref 70–99)
GLUCOSE BLDC GLUCOMTR-MCNC: 174 MG/DL (ref 70–99)
HBA1C MFR BLD: 6.5 % (ref ?–5.7)
HCT VFR BLD AUTO: 38.7 %
HGB BLD-MCNC: 13.1 G/DL
MCH RBC QN AUTO: 31.4 PG (ref 26–34)
MCHC RBC AUTO-ENTMCNC: 33.9 G/DL (ref 31–37)
MCV RBC AUTO: 92.8 FL
OSMOLALITY SERPL CALC.SUM OF ELEC: 289 MOSM/KG (ref 275–295)
PLATELET # BLD AUTO: 202 10(3)UL (ref 150–450)
POTASSIUM SERPL-SCNC: 4.9 MMOL/L (ref 3.5–5.1)
RBC # BLD AUTO: 4.17 X10(6)UL
SODIUM SERPL-SCNC: 137 MMOL/L (ref 136–145)
WBC # BLD AUTO: 14.3 X10(3) UL (ref 4–11)

## 2024-11-01 PROCEDURE — 99214 OFFICE O/P EST MOD 30 MIN: CPT | Performed by: HOSPITALIST

## 2024-11-01 RX ORDER — VILAZODONE HYDROCHLORIDE 40 MG/1
40 TABLET ORAL DAILY
Status: DISCONTINUED | OUTPATIENT
Start: 2024-11-01 | End: 2024-11-01

## 2024-11-01 NOTE — PROGRESS NOTES
[facility]  part of [facility]    Progress Note    Curtis Menard Patient Status:  Outpatient in a Bed    1970 MRN [id number]   Location [facility] Attending Micheal Sanchez,*   Hosp Day # 0 PCP No primary care provider on file.     Subjective:   Curtis Menard is a(n) 54 year old male who is POD#1 s/p right anatomic total shoulder arthroplasty. The patient denies any acute events overnight. The patient denies any nausea, vomiting, chest pain or SOB. The patient's pain is well controlled. He denies other issues at this time    Objective:   Vital Signs:  Blood pressure 115/70, pulse 75, temperature 97.7 °F (36.5 °C), temperature source Oral, resp. rate 18, height 6' 4\" (1.93 m), weight (!) 335 lb (152 kg), SpO2 93%.     General: No acute distress. Alert and oriented x 3.  Neurologic: No focal neurological deficits.  Musculoskeletal: Sling is in place. Able to fire EPL/FDPi/intrinsics/posterior deltoid. SILT to M/U/R/A.   Psychiatric: Appropriate mood and affect.    Assessment and Plan:     Primary osteoarthritis of right shoulder  S/p Right anatomic total shoulder arthroplasty  -NWB, sling at all times   -drain removed   -pain control: PO oxycodone and tylenol   -OT consult:   --follow anatomic protocol (): okay for active hand and wrist ROM, pendulums, donning and doffing of sling, no active shoulder ROM   -appreciate medicine consult  -dispo: home today pending OT and medicine clearance       Essential hypertension  Continue home treatment       Hyperlipidemia  Continue home treatmetn      Hypothyroidism  Continue home treatment      LATONIA (obstructive sleep apnea)  Continue home treatment            Results:     Lab Results   Component Value Date    WBC 14.3 (H) 2024    HGB 13.1 2024    HCT 38.7 (L) 2024    .0 2024       XR SHOULDER, COMPLETE (MIN 2 VIEWS), RIGHT (CPT=73030)    Result Date: 10/31/2024  CONCLUSION:  1. Right  shoulder arthroplasty.    Dictated by (CST): Suleman Wang MD on 10/31/2024 at 11:28 AM     Finalized by (CST): Suleman Wang MD on 10/31/2024 at 11:29 AM                     Trena Moore PA-C  11/1/2024

## 2024-11-01 NOTE — OCCUPATIONAL THERAPY NOTE
OCCUPATIONAL THERAPY EVALUATION - INPATIENT     Room Number: Room 8/Room 8-A  Evaluation Date: 2024  Type of Evaluation: Initial  Presenting Problem: R total shoulder arthroplasty, biceps tenodesis    Physician Order: IP Consult to Occupational Therapy  Reason for Therapy: ADL/IADL Dysfunction and Discharge Planning    OCCUPATIONAL THERAPY ASSESSMENT   Patient is a 54 year old male admitted 10/31/2024.  Prior to admission, patient's baseline is mod-I.  Patient is currently functioning near baseline with ADLS.    PLAN  Patient has been evaluated and presents with no skilled inpatient Occupational Therapy needs  at this time.  Patient will be discharged from Occupational Therapy services. Please re-order if a new functional limitation presents during this admission. Would benefit from out-patient therapy when cleared by surgeon    OT Device Recommendations: None    OCCUPATIONAL THERAPY MEDICAL/SOCIAL HISTORY   Problem List  Active Problems:    Primary osteoarthritis of right shoulder    Essential hypertension    Hyperlipidemia    Hypothyroidism    Morbid obesity with BMI of 40.0-44.9, adult (HCC)    LATONIA (obstructive sleep apnea)    HOME SITUATION  Type of Home: House  Home Layout: Two level; Able to live on main level  Lives With: Spouse  Toilet and Equipment: Comfort height toilet  Shower/Tub and Equipment: Tub-shower combo  Occupation/Status: desk job  Hand Dominance: Right  Drives: Yes  Patient Regularly Uses: Glasses    Prior Level of [place]: mod I-I     SUBJECTIVE  \"I want to try myself\"- donning sling    OCCUPATIONAL THERAPY EXAMINATION    OBJECTIVE  Precautions: Other (Comment) (no motion at shoulder, sling all times except ADLs/exercises, PROM elbow ok, AROM wrist/fingers ok)  Fall Risk: Standard fall risk    WEIGHT BEARING RESTRICTION  R Upper Extremity: Non-Weight Bearing    PAIN ASSESSMENT  Ratin  Location: RUE  Management Techniques: Relaxation; Repositioning; Ice        COGNITION  Overall  Cognitive Status:  WFL - within functional limits    VISION  Current Vision: wears glasses all the time      SENSATION  Numbness/tingling R digits 1-3    Communication: WFL    Behavioral/Emotional/Social: WFL    RANGE OF MOTION   Upper extremity ROM is within functional limits except R shoulder not tested, R elbow PROM WFL    STRENGTH ASSESSMENT  Upper extremity strength is within functional limits LUE; RUE not tested with NWB status    COORDINATION  Gross Motor: [facility]   Fine Motor: [facility]     ACTIVITIES OF DAILY LIVING ASSESSMENT  AM-PAC ‘6-Clicks’ Inpatient Daily Activity Short Form  How much help from another person does the patient currently need…  -   Putting on and taking off regular lower body clothing?: None  -   Bathing (including washing, rinsing, drying)?: A Little  -   Toileting, which includes using toilet, bedpan or urinal? : None  -   Putting on and taking off regular upper body clothing?: A Little  -   Taking care of personal grooming such as brushing teeth?: None  -   Eating meals?: None    AM-PAC Score:  Score: 22  Approx Degree of Impairment: 25.8%  Standardized Score (AM-PAC Scale): 47.1  CMS Modifier (G-Code): CJ    FUNCTIONAL TRANSFER ASSESSMENT  Sit to Stand: Edge of Bed  Edge of Bed: Independent    BED MOBILITY  Supine to Sit : Modified Independent    BALANCE ASSESSMENT  Static Sitting: Independent  Static Standing: Independent    FUNCTIONAL ADL ASSESSMENT  Eating: Modified Independent  Grooming Standing: Modified Independent (simulated)  UB Dressing Standing: Minimal Assist (sling, CGA shirt and cues for for safety/katherin-techs)  LB Dressing Seated: Modified Independent (pants/underwear increased time)  Toileting Standing: Modified Independent (simulated)    THERAPEUTIC EXERCISE: pendulum exercises all planes x10 reps, cues for technique, posture and positioning, RUE PROM elbow x10 reps cues on how to assist with LUE; educated pt on technique for good arm assisted  degrees and passive  external rotation 40 degrees provided demos; all questions answered     Skilled Therapy Provided: RN approved session. Received patient in supine. Performed ADLs/functional mobility/transfers as stated above.  At the end of session, patient left in chair with needs met, alarm on and RN aware of session.    EDUCATION PROVIDED  Patient Education : Role of Occupational Therapy; Discharge Recommendations; Plan of Care; Other (UE HEP, katherin-techs dressing/bathing, compensatory techs; sling management)  Patient's Response to Education: Returned Demonstration; Verbalized Understanding  Family/Caregiver Education : Role of Occupational Therapy; Weight Bear Status; Surgical Precautions (sling management)  Family/Caregiver's Response to Education: Verbalized Understanding; Returned Demonstration    The patient's Approx Degree of Impairment: 25.8% has been calculated based on documentation in the [facility] '6 clicks' Inpatient Daily Activity Short Form.  Research supports that patients with this level of impairment may benefit from out-patient therapy when medically cleared.  Final disposition will be made by interdisciplinary medical team.    Patient End of Session: Up in chair;Needs met;Call light within reach;RN aware of session/findings;All patient questions and concerns addressed;Family present    Patient was able to achieve the following ...   Patient able to toilet transfer  at previous functional level    Patient able to dress lower extremities  at previous functional level    Patient/Caregiver able to demonstrate safety with ADLS  safely and independently - min A     Patient Evaluation Complexity Level:   Occupational Profile/Medical History LOW - Brief history including review of medical or therapy records    Specific performance deficits impacting engagement in ADL/IADL LOW  1 - 3 performance deficits    Client Assessment/Performance Deficits LOW - No comorbidities nor modifications of tasks    Clinical Decision Making  LOW - Analysis of occupational profile, problem-focused assessments, limited treatment options    Overall Complexity LOW     OT Session Time: 45 minutes  Self-Care Home Management: 15 minutes  Therapeutic Exercise: 15 minutes  15  min monico Trujillo, OTR/L

## 2024-11-01 NOTE — DISCHARGE SUMMARY
[facility] Hospitalist Discharge Summary   Patient ID:  Curtis Menard  [id number]  54 year old  2/11/1970    Admit date: 10/31/2024  Discharge date: 11/1/2024  Primary Care Physician: No primary care provider on file.   Attending Physician: Micheal Sanchez,*   Consults:   Consultants         Provider   Role Specialty     Sveta Quevedo MD      Consulting Physician HOSPITALIST            Discharge Diagnoses:   <principal problem not specified>    Reason for admission  Copied from admission H&P: please refer to preop H&P     Hospital Course:    R shoulder primary osteoarthritis   - s/p R total shoulder arthroplasty   - Pain controlled.   - OT - ok for home   - orthopedic surgery on consult.   - NWB sling at all times.   - home today Medically stable.     Other medical problems  Essential HTN  Hyperlipidemia  Hypothyroidism   LATONIA     EXAM:   GENERAL: no apparent distress, comfortable  NEURO: A/A Ox3, no focal deficits  RESP: non labored, CTAB/L  CARDIO: Regular, no murmur  ABD: soft, NT, ND  EXTREMITIES: no edema, no calf tenderness    Operative Procedures: Procedure(s) (LRB):  Right total shoulder arthroplasty, biceps tenodesis (Right)    Discharge Instructions     Medication List        CONTINUE taking these medications      Adderall XR 30 MG Cp24  Generic drug: amphetamine-dextroamphetamine ER     atorvastatin 40 MG Tabs  Commonly known as: Lipitor     buPROPion  MG Tb24  Commonly known as: Wellbutrin XL     hydroCHLOROthiazide 25 MG Tabs     levothyroxine 100 MCG Tabs  Commonly known as: Synthroid     losartan 100 MG Tabs  Commonly known as: Cozaar     metFORMIN  MG Tb24  Commonly known as: Glucophage XR     mupirocin 2 % Oint  Commonly known as: Bactroban     VIIBRYD OR            STOP taking these medications      omega-3 fatty acids 1000 MG Caps  Commonly known as: Fish Oil              Activity: activity as tolerated  Diet: regular diet  Wound Care: NA  Code Status: No  Order          Important follow up:      -PCP in [] within 7 days [] within 14 days [] other     Disposition: home  Discharged Condition: good    Hospital Discharge Diagnoses:  primary OA R shoulder    Lace+ Score: 13  59-90 High Risk  29-58 Medium Risk  0-28   Low Risk.    TCM Follow-Up Recommendation:  LACE < 29: Low Risk of readmission after discharge from the hospital. No TCM follow-up needed.            Total Time Coordinating Care: Greater than 30 minutes    Patient had opportunity to ask questions, state understanding, and agree with therapeutic plan as outlined    Paula Reddy MD  Hospitalist  11/1/2024

## (undated) DEVICE — 3 BONE CEMENT MIXER: Brand: MIXEVAC

## (undated) DEVICE — HANDPIECE SET WITH HIGH FLOW TIP AND SUCTION TUBE: Brand: INTERPULSE

## (undated) DEVICE — SHOULDER STABILIZATION KIT,                                    DISPOSABLE 12 PER BOX

## (undated) DEVICE — HOOD: Brand: FLYTE

## (undated) DEVICE — GUIDEWIRE 2.4MM X 228MM ALTIVATE ANAT AG ST

## (undated) DEVICE — 3M™ IOBAN™ 2 ANTIMICROBIAL INCISE DRAPE 6650EZ: Brand: IOBAN™ 2

## (undated) DEVICE — HOOD, PEEL-AWAY: Brand: FLYTE

## (undated) DEVICE — 3M™ STERI-DRAPE™ U-DRAPE 1015: Brand: STERI-DRAPE™

## (undated) DEVICE — SUT MCRYL 3-0 18IN PS-2 ABSRB UD 19MM 3/8 CIR

## (undated) DEVICE — 3M™ TEGADERM™ HP TRANSPARENT FILM DRESSING FRAME STYLE, 9534HP, 2-3/8 X 2-3/4 IN (6 CM X 7 CM), 100/CT 4CT/CASE: Brand: 3M™ TEGADERM™

## (undated) DEVICE — GAMMEX® PI HYBRID SIZE 7, STERILE POWDER-FREE SURGICAL GLOVE, POLYISOPRENE AND NEOPRENE BLEND: Brand: GAMMEX

## (undated) DEVICE — SOLUTION IRRIG 3000ML 0.9% NACL FLX CONT

## (undated) DEVICE — Device

## (undated) DEVICE — SHEET,DRAPE,53X77,STERILE: Brand: MEDLINE

## (undated) DEVICE — SPONGE LAP 18X18IN WHT COT 4 PLY FLD STRUNG

## (undated) DEVICE — APPLICATOR PREP 26ML CHG 2% ISO ALC 70%

## (undated) DEVICE — KIT EVAC 400CC DIA1/8IN H PAT 12.5IN 3 SPR

## (undated) DEVICE — GAMMEX® PI HYBRID SIZE 7.5, STERILE POWDER-FREE SURGICAL GLOVE, POLYISOPRENE AND NEOPRENE BLEND: Brand: GAMMEX

## (undated) DEVICE — AEGIS 1" DISK 4MM HOLE, PEEL OPEN: Brand: MEDLINE

## (undated) DEVICE — 3M™ COBAN™ NL STERILE NON-LATEX SELF-ADHERENT WRAP, 2084S, 4 IN X 5 YD (10 CM X 4,5 M), 18 ROLLS/CASE: Brand: 3M™ COBAN™

## (undated) DEVICE — SUT ETHBND XL 2 30IN V-37 NABSRB GRN 40MM 1/2

## (undated) DEVICE — SPONGE 4X4 10PK

## (undated) DEVICE — ANTIBACTERIAL UNDYED BRAIDED (POLYGLACTIN 910), SYNTHETIC ABSORBABLE SUTURE: Brand: COATED VICRYL

## (undated) DEVICE — INTENDED USE FOR SURGICAL MARKING ON INTACT SKIN, ALSO PROVIDES A PERMANENT METHOD OF IDENTIFYING OBJECTS IN THE OPERATING ROOM: Brand: WRITESITE® PLUS MINI PREP RESISTANT MARKER

## (undated) DEVICE — COVER,TABLE,60X90,STERILE: Brand: MEDLINE

## (undated) DEVICE — 3M™ STERI-STRIP™ REINFORCED ADHESIVE SKIN CLOSURES, R1547, 1/2 IN X 4 IN (12 MM X 100 MM), 6 STRIPS/ENVELOPE: Brand: 3M™ STERI-STRIP™

## (undated) DEVICE — VIOLET BRAIDED (POLYGLACTIN 910), SYNTHETIC ABSORBABLE SUTURE: Brand: COATED VICRYL

## (undated) DEVICE — COVER,MAYO STAND,STERILE: Brand: MEDLINE

## (undated) DEVICE — 1016 S-DRAPE IRRIG POUCH 10/BOX: Brand: STERI-DRAPE™

## (undated) DEVICE — PACK CDS SHOULDER

## (undated) DEVICE — SUT FBRWR 5 38IN N ABSRB BLU L48MM 1/2

## (undated) DEVICE — GAMMEX® PI HYBRID SIZE 8, STERILE POWDER-FREE SURGICAL GLOVE, POLYISOPRENE AND NEOPRENE BLEND: Brand: GAMMEX

## (undated) DEVICE — Device: Brand: STABLECUT®

## (undated) DEVICE — PENCIL ES BTTN SWCH W/ TIP HOLSTER E-Z CLN